# Patient Record
Sex: MALE | Race: WHITE
[De-identification: names, ages, dates, MRNs, and addresses within clinical notes are randomized per-mention and may not be internally consistent; named-entity substitution may affect disease eponyms.]

---

## 2017-02-03 ENCOUNTER — HOSPITAL ENCOUNTER (OUTPATIENT)
Dept: HOSPITAL 17 - CLAB | Age: 72
End: 2017-02-03
Attending: FAMILY MEDICINE
Payer: MEDICARE

## 2017-02-03 DIAGNOSIS — R31.9: Primary | ICD-10-CM

## 2017-02-03 LAB
COLOR UR: YELLOW
GLUCOSE UR STRIP-MCNC: (no result) MG/DL
HGB UR QL STRIP: (no result)
KETONES UR STRIP-MCNC: (no result) MG/DL
MUCOUS THREADS #/AREA URNS LPF: (no result) /LPF
NITRITE UR QL STRIP: (no result)
SP GR UR STRIP: 1.02 (ref 1–1.03)
SQUAMOUS #/AREA URNS HPF: 1 /HPF (ref 0–5)

## 2017-02-03 PROCEDURE — 81001 URINALYSIS AUTO W/SCOPE: CPT

## 2017-02-03 PROCEDURE — 87086 URINE CULTURE/COLONY COUNT: CPT

## 2018-04-13 ENCOUNTER — HOSPITAL ENCOUNTER (OUTPATIENT)
Dept: HOSPITAL 17 - HEND | Age: 73
End: 2018-04-13
Attending: INTERNAL MEDICINE
Payer: MEDICARE

## 2018-04-13 VITALS
SYSTOLIC BLOOD PRESSURE: 133 MMHG | HEART RATE: 62 BPM | TEMPERATURE: 97.5 F | OXYGEN SATURATION: 93 % | RESPIRATION RATE: 18 BRPM | DIASTOLIC BLOOD PRESSURE: 68 MMHG

## 2018-04-13 VITALS — WEIGHT: 150.8 LBS | HEIGHT: 68 IN | BODY MASS INDEX: 22.85 KG/M2

## 2018-04-13 DIAGNOSIS — R94.31: ICD-10-CM

## 2018-04-13 DIAGNOSIS — D12.3: ICD-10-CM

## 2018-04-13 DIAGNOSIS — K64.8: ICD-10-CM

## 2018-04-13 DIAGNOSIS — R19.5: Primary | ICD-10-CM

## 2018-04-13 DIAGNOSIS — D12.2: ICD-10-CM

## 2018-04-13 DIAGNOSIS — Z80.0: ICD-10-CM

## 2018-04-13 PROCEDURE — 93005 ELECTROCARDIOGRAM TRACING: CPT

## 2018-04-13 PROCEDURE — 00811 ANES LWR INTST NDSC NOS: CPT

## 2018-04-13 PROCEDURE — 88305 TISSUE EXAM BY PATHOLOGIST: CPT

## 2018-04-13 PROCEDURE — 45388 COLONOSCOPY W/ABLATION: CPT

## 2018-04-13 PROCEDURE — 45385 COLONOSCOPY W/LESION REMOVAL: CPT

## 2018-04-13 NOTE — GIPROC
[facility]

[address] [facility]. [facility], [address]

 

 

COLONOSCOPY PROCEDURE REPORT     EXAM DATE: 04/13/2018

 

PATIENT NAME:      Williams Ramesh           MR #:      [id number]

YOB: 1945      VISIT #:     [id number]

ENDOSCOPIST:     Cassandra Camarena MD     ORDER #:     [id number]

ASSISTANT:      Kristina Dozier and Cruzito Noble     STATUS:     outpatient

 

INDICATIONS:  The patient is a 73 yr old male here for a colonoscopy due to

Positive Cologuard and patient's immediate family history of colon cancer

PROCEDURE PERFORMED:     Colonoscopy with polypectomy

Colonoscopy with ablation

MEDICATIONS:     None and Per Anesthesia.

PREP QUALITY:     suboptimal

ESTIMATED BLOOD LOSS:     None

 

CONSENT: The patient understands the risks and benefits of the procedure and

understands that these risks include, but are not limited to: sedation,

allergic reaction, infection, perforation and/or bleeding. Alternative means of

evaluation and treatment include, among others: physical exam, x-rays, and/or

surgical intervention. The patient elects to proceed with this endoscopic

procedure.

 



medical equipment was checked for proper function. Hand hygiene and appropriate

measures for infection prevention was taken. After the risks, benefits and

alternatives of the procedure were thoroughly explained, Informed consent was

verified, confirmed and timeout was successfully executed by the treatment

team. A digital exam revealed no abnormalities of the rectum The Pentax

[id number] endoscope was introduced through the anus and advanced to the cecum,

which was identified by both the appendix and ileocecal valve. The instrument

was then slowly withdrawn as the colon was fully examined.

 

 

COLON FINDINGS: Significant amount of stool filling the cecum and the distal

part of the ascending colon unable to visualize the mucosa and significant

lesions can be missed.   1 cm polyp in the ascending colon removed by snare

2 small polyps in the ascending colon ablated with heat

1 cm polyp in the transverse colon removed by snare.   The colon mucosa was

otherwise normal. Retroflexed views revealed internal hemorrhoids The scope was

then completely withdrawn from the patient and the procedure terminated.

 

 

 

 

ADVERSE EVENTS:      There were no complications.

IMPRESSIONS:     1.  Significant amount of stool filling the cecum and the

distal part of the ascending colon unable to visualize the mucosa and

significant lesions can be missed

2.  1 cm polyp in the ascending colon removed by snare

2 small polyps in the ascending colon ablated with heat

1 cm polyp in the transverse colon removed by snare

3.  The colon mucosa was otherwise normal

4.  Retroflexed views revealed internal hemorrhoids

5.  Revealed no abnormalities of the rectum

 

RECOMMENDATIONS:     1.  Await biopsy results.  Biopsy results will not be ready

for 7-10 days.  If you don't hear from us in two weeks, call our office for

results.

2.  Yearly hemoccult

3.  High fiber diet

4.  Repeat colonoscopy in 1 month with longer prep with 2 days of clear liquid

and laxative 2 days in advance

 

Return to clinic in 2 weeks

RECALL:     Return 1 month Colonoscopy

 

_____________________________

Cassandra Camarena MD

eSigned:  Cassandra Camarena MD 04/13/2018 10:58 AM

 

 

cc:  Virginia Banks M.D.

 

 

 

 

PATIENT NAME:  Williams Ramesh

MR#: [id number]

## 2018-04-14 NOTE — EKG
Date Performed: 2018       Time Performed: 09:12:45

 

PTAGE:      73 years

 

EK% ATRIAL PACING Low limb lead voltage Diffuse ST-T abnormality, cannot exclude ischemia 
T wave inversions are slightly less from prior tracing, and atrial pacing is new. ABNORMAL ECG

 

PREVIOUS TRACING       : 11/15/2016 11.36

 

DOCTOR:   Ellis Wiseman  Interpretating Date/Time  2018 11:01:44